# Patient Record
Sex: FEMALE | Race: AMERICAN INDIAN OR ALASKA NATIVE
[De-identification: names, ages, dates, MRNs, and addresses within clinical notes are randomized per-mention and may not be internally consistent; named-entity substitution may affect disease eponyms.]

---

## 2021-09-14 ENCOUNTER — HOSPITAL ENCOUNTER (EMERGENCY)
Dept: HOSPITAL 5 - ED | Age: 36
Discharge: HOME | End: 2021-09-14
Payer: MEDICAID

## 2021-09-14 VITALS — SYSTOLIC BLOOD PRESSURE: 157 MMHG | DIASTOLIC BLOOD PRESSURE: 103 MMHG

## 2021-09-14 DIAGNOSIS — Z88.1: ICD-10-CM

## 2021-09-14 DIAGNOSIS — Z20.2: ICD-10-CM

## 2021-09-14 DIAGNOSIS — K64.9: Primary | ICD-10-CM

## 2021-09-14 PROCEDURE — 99282 EMERGENCY DEPT VISIT SF MDM: CPT

## 2021-09-14 NOTE — EMERGENCY DEPARTMENT REPORT
ED General Adult HPI





- General


Chief complaint: Rectal Pain


Stated complaint: STD/PAIN X 3 DAYS


Time Seen by Provider: 09/14/21 17:29


Source: patient


Mode of arrival: Ambulatory


Limitations: No Limitations





- History of Present Illness


Severity scale (0 -10): 7





- Related Data


                                  Previous Rx's











 Medication  Instructions  Recorded  Last Taken  Type


 


Hydrocortisone [Anucort-HC SUPPOS] 25 mg RC BID #10 supp.rect 09/14/21 Unknown 

Rx


 


Hydrocortisone [Anusol-Hc 2.5% TOP 25 mg RC TID PRN #30 cream..g. 09/14/21 

Unknown Rx





CREAM]    


 


Lidocaine [Lidocaine Cream] 15 gm TP Q4HR PRN #15 cream..g. 09/14/21 Unknown Rx











                                    Allergies











Allergy/AdvReac Type Severity Reaction Status Date / Time


 


metronidazole [From Flagyl] Allergy  Swelling Verified 09/14/21 17:21














ED Review of Systems


ROS: 


Stated complaint: STD/PAIN X 3 DAYS


Other details as noted in HPI





Comment: All other systems reviewed and negative





ED Past Medical Hx





- Medications


Home Medications: 


                                Home Medications











 Medication  Instructions  Recorded  Confirmed  Last Taken  Type


 


Hydrocortisone [Anucort-HC SUPPOS] 25 mg RC BID #10 supp.rect 09/14/21  Unknown 

Rx


 


Hydrocortisone [Anusol-Hc 2.5% TOP 25 mg RC TID PRN #30 cream..g. 09/14/21  

Unknown Rx





CREAM]     


 


Lidocaine [Lidocaine Cream] 15 gm TP Q4HR PRN #15 cream..g. 09/14/21  Unknown Rx














ED Physical Exam





- General


Limitations: No Limitations


General appearance: alert, in no apparent distress





- Head


Head exam: Present: atraumatic, normocephalic





- Eye


Eye exam: Present: normal appearance, PERRL


Pupils: Present: normal accommodation





- ENT


ENT exam: Present: normal exam, mucous membranes moist, TM's normal bilaterally





- Neck


Neck exam: Present: normal inspection, full ROM





- Respiratory


Respiratory exam: Present: normal lung sounds bilaterally.  Absent: respiratory 

distress, wheezes, rales, rhonchi, accessory muscle use, decreased breath sounds





- Cardiovascular


Cardiovascular Exam: Present: regular rate, normal rhythm.  Absent: systolic 

murmur, diastolic murmur, rubs, gallop





- GI/Abdominal


GI/Abdominal exam: Present: soft, normal bowel sounds





- Rectal


Rectal exam: Present: hemorrhoids, other (No chaperone present)





- Extremities Exam


Extremities exam: Present: normal inspection





- Back Exam


Back exam: Present: normal inspection.  Absent: CVA tenderness (R), CVA 

tenderness (L)





- Neurological Exam


Neurological exam: Present: alert, oriented X3, CN II-XII intact





- Psychiatric


Psychiatric exam: Present: normal affect, normal mood





- Skin


Skin exam: Present: warm, dry, intact, normal color.  Absent: rash





ED Course





                                   Vital Signs











  09/14/21





  17:17


 


Temperature 98.2 F


 


Pulse Rate 100 H


 


Respiratory 16





Rate 


 


Blood Pressure 157/103





[Right] 


 


O2 Sat by Pulse 100





Oximetry 














ED Medical Decision Making





- Medical Decision Making





35-year-old female evaluate emergency department today for rectal pain was found

to have the symptoms were due to hemorrhoids.  Advised patient on the 

appropriate treatment for the hemorrhoids with creams and suppositories and 

further definitive treatment to be administered by the gastroenterologist.  She 

been advised to stay hydrated and eat a high-fiber diet and taking stool stool 

softeners and also been briefed on return precautions.





In regards to her suspicion for possible coming in contact with an STD but 

having minimal symptoms she been referred to the health department


Critical care attestation.: 


If time is entered above; I have spent that time in minutes in the direct care 

of this critically ill patient, excluding procedure time.








ED Disposition


Clinical Impression: 


 Hemorrhoids, Possible exposure to STD





Disposition: 01 HOME / SELF CARE / HOMELESS


Is pt being admited?: No


Does the pt Need Aspirin: No


Condition: Stable


Instructions:  Hemorrhoids, Dysuria, Safe Sex


Additional Instructions: 


You have been evaluated department today for your rectal pain.  Evaluation has 

revealed that your symptoms are due to hemorrhoids.  You can apply hemorrhoid 

cream or suppository which ever is available and do sitz bath to soothe the 

area.  Stay well-hydrated and eat a high-fiber diet and take stool softeners you

should not strain on the toilet.  Please follow-up with your primary care 

provider and also schedule problem with the the gastroenterologist which we have

listed in your paperwork today.  Return to emergency department if you 

experience worsening bleeding, feeling lightheaded, shortness of breath, 

headache, feeling weak or having fever or any other concerning symptoms


Prescriptions: 


Hydrocortisone [Anucort-HC SUPPOS] 25 mg RC BID #10 supp.rect


Hydrocortisone [Anusol-Hc 2.5% TOP CREAM] 25 mg RC TID PRN #30 cream..g.


 PRN Reason: rectal pain


Lidocaine [Lidocaine Cream] 15 gm TP Q4HR PRN #15 cream..g.


 PRN Reason: rectal pain


Referrals: 


[facility] [Provider Group] - 3-5 Days


[facility] [Provider Group] - 3-5 Days


[facility] CARE,MD [Primary Care Provider] - 3-5 Days


[facility] [Outside] - 3-5 Days

## 2021-12-23 ENCOUNTER — HOSPITAL ENCOUNTER (EMERGENCY)
Dept: HOSPITAL 5 - ED | Age: 36
LOS: 1 days | Discharge: HOME | End: 2021-12-24
Payer: MEDICAID

## 2021-12-23 DIAGNOSIS — Z88.8: ICD-10-CM

## 2021-12-23 DIAGNOSIS — G89.29: ICD-10-CM

## 2021-12-23 DIAGNOSIS — M62.830: Primary | ICD-10-CM

## 2021-12-23 DIAGNOSIS — Z88.2: ICD-10-CM

## 2021-12-23 DIAGNOSIS — M54.42: ICD-10-CM

## 2021-12-23 DIAGNOSIS — Z98.890: ICD-10-CM

## 2021-12-23 DIAGNOSIS — N39.0: ICD-10-CM

## 2021-12-23 DIAGNOSIS — Z79.899: ICD-10-CM

## 2021-12-23 DIAGNOSIS — J01.10: ICD-10-CM

## 2021-12-23 DIAGNOSIS — J20.9: ICD-10-CM

## 2021-12-23 LAB
BACTERIA #/AREA URNS HPF: (no result) /HPF
BILIRUB UR QL STRIP: (no result)
BLOOD UR QL VISUAL: (no result)
HYALINE CASTS #/AREA URNS LPF: 2 /LPF
MUCOUS THREADS #/AREA URNS HPF: (no result) /HPF
PH UR STRIP: 5 [PH] (ref 5–7)
RBC #/AREA URNS HPF: 23 /HPF (ref 0–6)
UROBILINOGEN UR-MCNC: < 2 MG/DL (ref ?–2)
WBC #/AREA URNS HPF: 2 /HPF (ref 0–6)

## 2021-12-23 PROCEDURE — 87400 INFLUENZA A/B EACH AG IA: CPT

## 2021-12-23 PROCEDURE — 71046 X-RAY EXAM CHEST 2 VIEWS: CPT

## 2021-12-23 PROCEDURE — 99284 EMERGENCY DEPT VISIT MOD MDM: CPT

## 2021-12-23 PROCEDURE — 81025 URINE PREGNANCY TEST: CPT

## 2021-12-23 PROCEDURE — 81001 URINALYSIS AUTO W/SCOPE: CPT

## 2021-12-23 PROCEDURE — 96372 THER/PROPH/DIAG INJ SC/IM: CPT

## 2021-12-24 VITALS — SYSTOLIC BLOOD PRESSURE: 138 MMHG | DIASTOLIC BLOOD PRESSURE: 82 MMHG

## 2021-12-24 NOTE — XRAY REPORT
CHEST 2 VIEWS 



INDICATION / CLINICAL INFORMATION:

cough.



COMPARISON: 

None available.



FINDINGS:



SUPPORT DEVICES: None.

HEART / MEDIASTINUM: No significant abnormality. 

LUNGS / PLEURA: No significant pulmonary abnormality. No significant pleural effusion. No pneumothora
x. 



ADDITIONAL FINDINGS: No significant additional findings.



IMPRESSION:

1. No acute abnormality of the chest.



Signer Name: Jackson Macedo MD 

Signed: 12/24/2021 12:57 AM

Workstation Name: [id number]

## 2021-12-24 NOTE — EMERGENCY DEPARTMENT REPORT
ED General Adult HPI





- General


Chief complaint: Headache


Stated complaint: BODYACHES


Source: patient


Mode of arrival: Ambulatory


Limitations: No Limitations





- History of Present Illness


Initial comments: 





Patient is a 36-year-old female with a history of chronic low back pain due to 

lumbar disc disease and herniation who presented to the ED with complaint of 

acute onset persistent nasal and sinus congestion, persistent dry cough for the 

last 2 months.  Patient also complains of diffuse body aches and pains, 

worsening low back pain with urinary frequency and urgency for the last 1 week. 

Patient denies dizziness, fall, traumatic injury, heavy lifting, chest pain or 

shortness of breath, nausea and vomiting or abdominal pain, dysuria, vaginal 

bleeding, vaginal discharge, heavy lifting, neck pain, headache, diarrhea or 

numbness and tingling or weakness of lower extremities bilaterally.


MD Complaint: Chronic low back pain; persistent dry cough; diffuse body aches 

and pains;


-: Sudden, week(s) (2)


Location: head, chest, back


Radiation: non-radiation


Severity scale (0 -10): 8


Quality: aching, sharp


Consistency: constant


Improves with: none


Associated Symptoms: denies other symptoms, cough, headaches, malaise.  denies: 

confusion, chest pain, diaphoresis, fever/chills, loss of appetite, 

nausea/vomiting, rash, seizure, shortness of breath, syncope, weakness


Treatments Prior to Arrival: NSAID





- Related Data


                                  Previous Rx's











 Medication  Instructions  Recorded  Last Taken  Type


 


Hydrocortisone [Anucort-HC SUPPOS] 25 mg RC BID #10 supp.rect 21 Unknown 

Rx


 


Hydrocortisone [Anusol-Hc 2.5% TOP 25 mg RC TID PRN #30 cream..g. 21 

Unknown Rx





CREAM]    


 


Lidocaine [Lidocaine Cream] 15 gm TP Q4HR PRN #15 cream..g. 21 Unknown Rx


 


Baclofen 20 mg PO Q12H PRN #20 tablet 21 Unknown Rx


 


Benzonatate [Tessalon Perles] 100 mg PO Q8HR #30 capsule 21 Unknown Rx


 


Butalb/Acetamin/Caff -40 1 - 2 tab PO Q6HR PRN #15 tab 21 Unknown Rx





[Fioricet -40]    


 


Ibuprofen [Motrin] 800 mg PO Q8HR PRN #30 tablet 21 Unknown Rx


 


levoFLOXacin [Levaquin TAB] 500 mg PO QDAY #10 tablet 21 Unknown Rx


 


predniSONE [Deltasone] 40 mg PO QDAY #10 tab 21 Unknown Rx











                                    Allergies











Allergy/AdvReac Type Severity Reaction Status Date / Time


 


carbinoxamine [From Rondec] Allergy  Unknown Verified 21 20:07


 


cephalexin [From Keflex] Allergy  Unknown Verified 21 20:07


 


metronidazole [From Flagyl] Allergy  Swelling Verified 21 17:21


 


pseudoephedrine [From Rondec] Allergy  Unknown Verified 21 20:07


 


Sulfa (Sulfonamide Allergy  Unknown Verified 21 20:07





Antibiotics)     














ED Review of Systems


ROS: 


Stated complaint: BODYACHES


Other details as noted in HPI





Constitutional: chills, malaise.  denies: fever


Eyes: denies: eye pain, eye discharge, vision change


ENT: congestion, other.  denies: ear pain, throat pain


Respiratory: cough.  denies: shortness of breath, SOB with exertion, SOB at 

rest, wheezing


Cardiovascular: denies: chest pain, palpitations


Endocrine: no symptoms reported.  denies: flushing, intolerance to cold, 

increased hunger, increased thirst


Gastrointestinal: denies: abdominal pain, nausea, vomiting, diarrhea


Genitourinary: denies: urgency, dysuria, discharge


Musculoskeletal: back pain (Low back pain), arthralgia, myalgia.  denies: joint 

swelling


Skin: denies: rash, lesions


Neurological: headache.  denies: weakness, paresthesias


Psychiatric: denies: anxiety, depression


Hematological/Lymphatic: denies: easy bleeding, easy bruising





ED Past Medical Hx





- Past Medical History


Previous Medical History?: Yes


Additional medical history: HERNIATED DISK CYST ON SPINE





- Surgical History


Past Surgical History?: Yes


Hx Cholecystectomy: Yes ()


Additional Surgical History: C SECTION .  C SECTION .  RIGHT ANKLE 

SURGERY 2017





- Medications


Home Medications: 


                                Home Medications











 Medication  Instructions  Recorded  Confirmed  Last Taken  Type


 


Hydrocortisone [Anucort-HC SUPPOS] 25 mg RC BID #10 supp.rect 21  Unknown 

Rx


 


Hydrocortisone [Anusol-Hc 2.5% TOP 25 mg RC TID PRN #30 cream..g. 21  

Unknown Rx





CREAM]     


 


Lidocaine [Lidocaine Cream] 15 gm TP Q4HR PRN #15 cream..g. 21  Unknown Rx


 


Baclofen 20 mg PO Q12H PRN #20 tablet 21  Unknown Rx


 


Benzonatate [Tessalon Perles] 100 mg PO Q8HR #30 capsule 21  Unknown Rx


 


Butalb/Acetamin/Caff -40 1 - 2 tab PO Q6HR PRN #15 tab 21  Unknown 

Rx





[Fioricet -40]     


 


Ibuprofen [Motrin] 800 mg PO Q8HR PRN #30 tablet 21  Unknown Rx


 


levoFLOXacin [Levaquin TAB] 500 mg PO QDAY #10 tablet 21  Unknown Rx


 


predniSONE [Deltasone] 40 mg PO QDAY #10 tab 21  Unknown Rx














ED Physical Exam





- General


Limitations: No Limitations


General appearance: alert, in no apparent distress





- Head


Head exam: Present: atraumatic, normocephalic, normal inspection





- Eye


Eye exam: Present: normal appearance, PERRL, EOMI


Pupils: Present: normal accommodation





- ENT


ENT exam: Present: normal orophraynx, mucous membranes moist, TM's normal 

bilaterally, normal external ear exam, other (Grossly congested nasal passages; 

palpable frontal sinus tenderness)





- Neck


Neck exam: Present: normal inspection, full ROM.  Absent: tenderness





- Respiratory


Respiratory exam: Present: normal lung sounds bilaterally.  Absent: respiratory 

distress, rhonchi, chest wall tenderness, accessory muscle use, decreased breath

 sounds, prolonged expiratory





- Cardiovascular


Cardiovascular Exam: Present: regular rate, normal rhythm, normal heart sounds. 

 Absent: systolic murmur, diastolic murmur, rubs, gallop





- GI/Abdominal


GI/Abdominal exam: Present: soft, normal bowel sounds.  Absent: tenderness, 

guarding, rebound, hyperactive bowel sounds, organomegaly, mass





- Extremities Exam


Extremities exam: Present: normal inspection, full ROM, normal capillary refill





- Back Exam


Back exam: Present: normal inspection, full ROM, tenderness (Palpable 

lumbosacral paraspinal musculoskeletal tenderness), muscle spasm, paraspinal 

tenderness.  Absent: CVA tenderness (L), vertebral tenderness





- Neurological Exam


Neurological exam: Present: alert, oriented X3, CN II-XII intact, normal gait, 

reflexes normal





- Psychiatric


Psychiatric exam: Present: normal affect, normal mood





- Skin


Skin exam: Present: warm, dry, intact, normal color.  Absent: rash





ED Course


                                   Vital Signs











  21





  20:02 22:53 22:54


 


Temperature 98.2 F  


 


Pulse Rate 97 H  


 


Respiratory 18 18 181 H





Rate   


 


Blood Pressure 122/89  





[Right]   


 


O2 Sat by Pulse 98  





Oximetry   














  21





  02:14 02:34


 


Temperature  


 


Pulse Rate 78 89


 


Respiratory 16 16





Rate  


 


Blood Pressure  138/82





[Right]  


 


O2 Sat by Pulse 100 92





Oximetry  














ED Medical Decision Making





- Radiology Data


Radiology results: report reviewed, image reviewed





[facility]  


                                     [address] [place] [address]  


 


                                            XRay Report   


                                               Signed  


 


Patient: SHAZIA MANRIQUE                                                        

        MR#: [id number]  


[id number]          


: 1985                                                                

Acct:[id number]      


 


Age/Sex: 36 / F                                                                

ADM Date: 21     


 


Loc: ED       


Attending Dr:   


 


 


Ordering Physician: ASIF JIMENEZ  


Date of Service: 21  


Procedure(s): XR chest routine 2V  


Accession Number(s): [id number]  


 


cc: ASIF JIMENEZ   


 


Fluoro Time In Minutes:   


 


CHEST 2 VIEWS   


 


 INDICATION / CLINICAL INFORMATION:  


 cough.  


 


 COMPARISON:   


 None available.  


 


 FINDINGS:  


 


 SUPPORT DEVICES: None.  


 HEART / MEDIASTINUM: No significant abnormality.   


 LUNGS / PLEURA: No significant pulmonary abnormality. No significant pleural 

effusion. No 


pneumothorax.   


 


 ADDITIONAL FINDINGS: No significant additional findings.  


 


 IMPRESSION:  


 1. No acute abnormality of the chest.  


 


 Signer Name: Jackson Macedo MD   


 Signed: 2021 12:57 AM  


 Workstation Name: [id number]   


 


 


Transcribed By: MN  


Dictated By: Jackson Macedo MD  


Electronically Authenticated By: Jackson Macedo MD    


Signed Date/Time: 21                                


 


 


 


DD/DT: 21                                                            

  


TD/TT:








- Medical Decision Making





This is a 36-year-old female with a history of chronic low back pain due to 

lumbar disc disease and herniation who presented to the ED with complaint of 

acute onset persistent nasal and sinus congestion, persistent dry cough for the 

last 2 months.  Patient also complains of diffuse body aches and pains, 

worsening low back pain with urinary frequency and urgency for the last 1 week. 

 In the ED, patient is alert and oriented x3 and is not in distress.  Patient 

was treated for pain in the ED.  Chest x-ray showed no acute cardiopulmonary 

abnormalities or pneumonitis.  Urinalysis showed urinary tract infection.  Rapid

 influenza test was negative.  On reevaluation, patient felt better, pain is 

well controlled medication.  Patient was discharged home on medications and 

advised to follow-up with her primary care physician in 7 to 10 days for 

reevaluation or return to the ED immediately if symptoms get worse.





- Differential Diagnosis


Bronchitis; pneumonia; UTI; chronic back pain; muscle spasm


Critical care attestation.: 


If time is entered above; I have spent that time in minutes in the direct care 

of this critically ill patient, excluding procedure time.








ED Disposition


Clinical Impression: 


 Spasm of muscle of lower back, Sinus headache, Acute urinary tract infection





Chronic low back pain with sciatica


Qualifiers:


 Back pain laterality: bilateral Sciatica laterality: bilateral sciatica 

Qualified Code(s): M54.42 - Lumbago with sciatica, left side





Acute frontal sinusitis, unspecified


Qualifiers:


 Recurrence: non-recurrent Qualified Code(s): J01.10 - Acute frontal sinusitis, 

unspecified





Acute bronchitis


Qualifiers:


 Bronchitis organism: unspecified organism Qualified Code(s): J20.9 - Acute 

bronchitis, unspecified





Disposition: 01 HOME / SELF CARE / HOMELESS


Is pt being admited?: No


Does the pt Need Aspirin: No


Condition: Stable


Instructions:  Muscle Cramps and Spasms, Easy-to-Read, Sinusitis, Adult, 

Easy-to-Read, Urinary Tract Infection, Adult, Easy-to-Read, Acute Bronchitis, 

Adult, Easy-to-Read, Upper Respiratory Infection, Adult, Easy-to-Read, Chronic 

Back Pain, Easy-to-Read, Acute Bronchitis (ED)


Additional Instructions: 


Chest x-ray showed no acute cardiopulmonary abnormalities or pneumonitis.  

Urinalysis showed urinary tract infection.  Therefore take medication with food,

 drink plenty of fluids and follow-up with your primary care physician in 7 to 

10 days for reevaluation.  Return to the ED immediately if symptoms get worse.


Prescriptions: 


Baclofen 20 mg PO Q12H PRN #20 tablet


 PRN Reason: Muscle Spasm


predniSONE [Deltasone] 40 mg PO QDAY #10 tab


Butalb/Acetamin/Caff -40 [Fioricet -40] 1 - 2 tab PO Q6HR PRN #15 

tab


 PRN Reason: Headache


levoFLOXacin [Levaquin TAB] 500 mg PO QDAY #10 tablet


Ibuprofen [Motrin] 800 mg PO Q8HR PRN #30 tablet


 PRN Reason: Pain , Severe (7-10)


Benzonatate [Tessalon Perles] 100 mg PO Q8HR #30 capsule


Referrals: 


[facility] [Provider Group] - 7-10 days


Time of Disposition: 02:11


Print Language: ENGLISH
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
## 2022-07-18 NOTE — EMERGENCY DEPARTMENT REPORT
ED General Adult HPI





- General


Chief complaint: Chest Pain


Stated complaint: GENERAL SICKNESS


Time Seen by Provider: 07/18/22 06:25


Source: patient


Mode of arrival: Ambulatory


Limitations: No Limitations





- History of Present Illness


Initial comments: 





36-year-old female smoker, has been contact with positive COVID persons presents

emerged Us complaining of cough congestion and coryza for the last 2 to 3 

days.  Pain radiates up and down the right sternal border of the chest and 

worsens with palpation and range of motion.  Reports no fever, chills, sweats


-: Gradual


Radiation: non-radiation


Severity scale (0 -10): 10


Consistency: constant


Improves with: none


Worsens with: none, medication


Associated Symptoms: denies other symptoms





- Related Data


                                  Previous Rx's











 Medication  Instructions  Recorded  Last Taken  Type


 


Hydrocortisone [Anucort-HC SUPPOS] 25 mg RC BID #10 supp.rect 09/14/21 Unknown 

Rx


 


Hydrocortisone [Anusol-Hc 2.5% TOP 25 mg RC TID PRN #30 cream..g. 09/14/21 

Unknown Rx





CREAM]    


 


Lidocaine [Lidocaine Cream] 15 gm TP Q4HR PRN #15 cream..g. 09/14/21 Unknown Rx


 


Baclofen 20 mg PO Q12H PRN #20 tablet 12/24/21 Unknown Rx


 


Benzonatate [Tessalon Perles] 100 mg PO Q8HR #30 capsule 12/24/21 Unknown Rx


 


Butalb/Acetamin/Caff -40 1 - 2 tab PO Q6HR PRN #15 tab 12/24/21 Unknown Rx





[Fioricet -40]    


 


Ibuprofen [Motrin] 800 mg PO Q8HR PRN #30 tablet 12/24/21 Unknown Rx


 


levoFLOXacin [Levaquin TAB] 500 mg PO QDAY #10 tablet 12/24/21 Unknown Rx


 


predniSONE [Deltasone] 40 mg PO QDAY #10 tab 12/24/21 Unknown Rx


 


Ketorolac [Toradol] 10 mg PO Q6H PRN #20 01/21/22 Unknown Rx


 


Ketorolac [Toradol] 10 mg PO Q6H PRN #14 07/18/22 Unknown Rx











                                    Allergies











Allergy/AdvReac Type Severity Reaction Status Date / Time


 


carbinoxamine [From Rondec] Allergy  Unknown Verified 12/23/21 20:07


 


cephalexin [From Keflex] Allergy  Unknown Verified 12/23/21 20:07


 


metronidazole [From Flagyl] Allergy  Swelling Verified 09/14/21 17:21


 


pseudoephedrine [From Rondec] Allergy  Unknown Verified 12/23/21 20:07


 


Sulfa (Sulfonamide Allergy  Unknown Verified 12/23/21 20:07





Antibiotics)     














ED Review of Systems


ROS: 


Stated complaint: GENERAL SICKNESS


Other details as noted in HPI





Comment: All other systems reviewed and negative





ED Past Medical Hx





- Past Medical History


Hx Kidney Stones: Yes (2 KIDNEY STONES IN LT LOBE)


Additional medical history: HERNIATED DISK CYST ON SPINE





- Surgical History


Hx Cholecystectomy: Yes (2011)


Additional Surgical History: C SECTION 2000.  C SECTION 2020.  RIGHT ANKLE 

SURGERY 2017





- Social History


Smoking Status: Unknown if ever smoked





- Medications


Home Medications: 


                                Home Medications











 Medication  Instructions  Recorded  Confirmed  Last Taken  Type


 


Hydrocortisone [Anucort-HC SUPPOS] 25 mg RC BID #10 supp.rect 09/14/21  Unknown 

Rx


 


Hydrocortisone [Anusol-Hc 2.5% TOP 25 mg RC TID PRN #30 cream..g. 09/14/21  

Unknown Rx





CREAM]     


 


Lidocaine [Lidocaine Cream] 15 gm TP Q4HR PRN #15 cream..g. 09/14/21  Unknown Rx


 


Baclofen 20 mg PO Q12H PRN #20 tablet 12/24/21  Unknown Rx


 


Benzonatate [Tessalon Perles] 100 mg PO Q8HR #30 capsule 12/24/21  Unknown Rx


 


Butalb/Acetamin/Caff -40 1 - 2 tab PO Q6HR PRN #15 tab 12/24/21  Unknown 

Rx





[Fioricet -40]     


 


Ibuprofen [Motrin] 800 mg PO Q8HR PRN #30 tablet 12/24/21  Unknown Rx


 


levoFLOXacin [Levaquin TAB] 500 mg PO QDAY #10 tablet 12/24/21  Unknown Rx


 


predniSONE [Deltasone] 40 mg PO QDAY #10 tab 12/24/21  Unknown Rx


 


Ketorolac [Toradol] 10 mg PO Q6H PRN #20 01/21/22  Unknown Rx


 


Ketorolac [Toradol] 10 mg PO Q6H PRN #14 07/18/22  Unknown Rx














ED Physical Exam





- General


Limitations: No Limitations


General appearance: alert, in no apparent distress





- Head


Head exam: Present: atraumatic, normocephalic





- Eye


Eye exam: Present: normal appearance, PERRL, EOMI, scleral icterus, conjunctival

 injection, periorbital swelling


Pupils: Present: normal accommodation





- ENT


ENT exam: Present: normal exam, mucous membranes moist, TM's normal bilaterally





- Neck


Neck exam: Present: normal inspection, full ROM





- Respiratory


Respiratory exam: Present: wheezes (Mild).  Absent: respiratory distress





- Cardiovascular


Cardiovascular Exam: Present: regular rate, normal rhythm.  Absent: systolic 

murmur, diastolic murmur, rubs, gallop





- GI/Abdominal


GI/Abdominal exam: Present: soft, normal bowel sounds





- Extremities Exam


Extremities exam: Present: normal inspection





- Back Exam


Back exam: Present: normal inspection





- Neurological Exam


Neurological exam: Present: alert, oriented X3





- Psychiatric


Psychiatric exam: Present: normal affect, normal mood





- Skin


Skin exam: Present: warm, dry, intact, normal color.  Absent: rash





ED Course


                                   Vital Signs











  07/17/22 07/18/22 07/18/22





  19:51 04:09 04:12


 


Temperature 98.5 F  


 


Pulse Rate 80 88 


 


Respiratory 16 16 





Rate   


 


Blood Pressure 131/80  


 


Blood Pressure 131/80 147/75 





[Right]   


 


O2 Sat by Pulse 99 98 96





Oximetry   














  07/18/22





  06:28


 


Temperature 


 


Pulse Rate 72


 


Respiratory 18





Rate 


 


Blood Pressure 


 


Blood Pressure 153/66





[Right] 


 


O2 Sat by Pulse 96





Oximetry 














ED Medical Decision Making





- Lab Data


Result diagrams: 


                                 07/18/22 04:40





                                 07/18/22 04:40








                                   Lab Results











  07/18/22 07/18/22 07/18/22 Range/Units





  04:40 04:40 04:40 


 


WBC   9.7   (4.5-11.0)  K/mm3


 


RBC   4.61   (3.65-5.03)  M/mm3


 


Hgb   15.0 H   (10.1-14.3)  gm/dl


 


Hct   45.0 H   (30.3-42.9)  %


 


MCV   98 H   (79-97)  fl


 


MCH   33 H   (28-32)  pg


 


MCHC   33   (30-34)  %


 


RDW   13.3   (13.2-15.2)  %


 


Plt Count   207   (140-440)  K/mm3


 


Lymph % (Auto)   40.8 H   (13.4-35.0)  %


 


Mono % (Auto)   8.2 H   (0.0-7.3)  %


 


Eos % (Auto)   1.0   (0.0-4.3)  %


 


Baso % (Auto)   1.3   (0.0-1.8)  %


 


Lymph # (Auto)   3.9   (1.2-5.4)  K/mm3


 


Mono # (Auto)   0.8   (0.0-0.8)  K/mm3


 


Eos # (Auto)   0.1   (0.0-0.4)  K/mm3


 


Baso # (Auto)   0.1   (0.0-0.1)  K/mm3


 


Seg Neutrophils %   48.7   (40.0-70.0)  %


 


Seg Neutrophils #   4.7   (1.8-7.7)  K/mm3


 


Sodium    140  (137-145)  mmol/L


 


Potassium    3.3 L  (3.6-5.0)  mmol/L


 


Chloride    102.9  ()  mmol/L


 


Carbon Dioxide    27  (22-30)  mmol/L


 


Anion Gap    13  mmol/L


 


BUN    11  (7-17)  mg/dL


 


Creatinine    0.5 L  (0.6-1.2)  mg/dL


 


Estimated GFR    > 60  ml/min


 


BUN/Creatinine Ratio    22  %


 


Glucose    98  ()  mg/dL


 


Calcium    9.2  (8.4-10.2)  mg/dL


 


Total Bilirubin    0.20  (0.1-1.2)  mg/dL


 


AST    14  (5-40)  units/L


 


ALT    17  (7-56)  units/L


 


Alkaline Phosphatase    109  ()  units/L


 


Total Protein    7.1  (6.3-8.2)  g/dL


 


Albumin    4.2  (3.9-5)  g/dL


 


Albumin/Globulin Ratio    1.4  %


 


HCG, Qual  Negative    (Negative)  














- EKG Data


EKG shows normal: sinus rhythm


Rate: normal





- Radiology Data


Radiology results: report reviewed


Critical care attestation.: 


If time is entered above; I have spent that time in minutes in the direct care 

of this critically ill patient, excluding procedure time.








ED Disposition


Clinical Impression: 


 Chest pain





Disposition: 01 HOME / SELF CARE / HOMELESS


Is pt being admited?: No


Does the pt Need Aspirin: No


Condition: Stable


Instructions:  Nonspecific Chest Pain, Adult, Costochondritis


Prescriptions: 


Ketorolac [Toradol] 10 mg PO Q6H PRN #14


 PRN Reason: Pain


Referrals: 


GISEL MCHUGH MD [Primary Care Provider] - 3-5 Days

## 2022-07-18 NOTE — XRAY REPORT
CHEST 2 VIEWS 



INDICATION / CLINICAL INFORMATION: Chest Pain. 



COMPARISON: Chest x-ray 12/24/2021



FINDINGS:



SUPPORT DEVICES: None.

HEART / MEDIASTINUM: Heart size and mediastinal contour appear within normal limits. 

LUNGS / PLEURA: No significant pulmonary or pleural abnormality. No pneumothorax. 

BONES: No significant osseous abnormality.

ADDITIONAL FINDINGS: No significant additional findings.



IMPRESSION:

1. No active cardiopulmonary disease.



Signer Name: Petros Conklin II, MD 

Signed: 7/18/2022 6:12 AM

Workstation Name: [id number]

## 2022-07-19 NOTE — ELECTROCARDIOGRAPH REPORT
[facility]

                                       

Test Date:    2022               Test Time:    20:04:26

Pat Name:     SHAZIA MANRIQUE                Department:   

Patient ID:   [id number]          Room:          

Gender:       F                        Technician:   

:          1985               Requested By: KASSI NG

Order Number: [id number]              Reading MD:   Serena Delaney

                                 Measurements

Intervals                              Axis          

Rate:         69                       P:            65

HI:           132                      QRS:          79

QRSD:         93                       T:            46

QT:           397                                    

QTc:          425                                    

                           Interpretive Statements

Sinus rhythm

Probable left atrial enlargement

No previous ECG available for comparison

Electronically Signed On 2022 18:50:59 EDT by Serena Delaney